# Patient Record
Sex: MALE
[De-identification: names, ages, dates, MRNs, and addresses within clinical notes are randomized per-mention and may not be internally consistent; named-entity substitution may affect disease eponyms.]

---

## 2024-05-15 ENCOUNTER — CARE COORDINATION (OUTPATIENT)
Dept: OTHER | Facility: CLINIC | Age: 89
End: 2024-05-15

## 2024-05-15 NOTE — CARE COORDINATION
Care Transitions Note    Initial Call - Call within 2 business days of discharge: Yes     Patient Current Location:  [place]    Care Transition Nurse contacted the patient by telephone to perform post hospital discharge assessment, verified name and  as identifiers. Provided introduction to self, and explanation of the Care Transition Nurse role.     Patient: Keith Parsons    Patient : 1935   MRN: [id number]    Reason for Admission: Nonrheumatic aortic (valve) stenosis  Discharge Date:    RURS: No data recorded      Was this an external facility discharge? Yes. Discharge Date: 24. Facility Name: [facility]      Care Summary Note: CTN reached out to patient at this time regarding recent hospital admission. Patient reports he has no care management needs at this time and does have nursing services where he lives. Patient appreciates CTN reaching out but declined further follow up calls at this time.       Patient closed (declined further [facility] services) from the Care Transitions program on 5/15/24.    Handoff:   Patient was not referred to the ACM team due to patient declined services.      Patient has agreed to contact primary care provider and/or specialist for any further questions, concerns, or needs.    Aracely Lewis RN

## 2024-11-07 ENCOUNTER — CARE COORDINATION (OUTPATIENT)
Dept: OTHER | Facility: CLINIC | Age: 89
End: 2024-11-07

## 2024-11-07 NOTE — CARE COORDINATION
Care Transitions Note    Initial Call - Call within 2 business days of discharge: Yes    Patient Current Location:  [place]    Care Transition Nurse contacted the patient by telephone to perform post hospital discharge assessment, verified name and  as identifiers. Provided introduction to self, and explanation of the Care Transition Nurse role.     Patient: Keith Parsons    Patient : 1935   MRN: [id number]    Reason for Admission: Aortic Stenosis  Discharge Date: 24   RURS:           Was this an external facility discharge? Yes. Discharge Date: 24. Facility Name: [facility]     Care Summary Note: CTN called patient today for an initial call following his recent hospital admission. Patient answered and allowed CTN to explain role. Patient stated he [place] in a [place]-way community where he has access to nurses and staff to help with his needs. Patient declined further CM services at this time. CTN allowed for questions to be asked and patient stated he had no questions or concerns at this time. CTN left number for any future needs. CTN will sign off.        Patient closed (declined further [facility] services) from the Care Transitions program on 24.    Handoff:   Patient was not referred to the ACM team due to patient declined services.      Patient has agreed to contact primary care provider and/or specialist for any further questions, concerns, or needs.      Jackie HAHN RN  Ambulatory    [phone]  [email or web address]